# Patient Record
Sex: MALE | Race: WHITE | NOT HISPANIC OR LATINO | Employment: FULL TIME | URBAN - METROPOLITAN AREA
[De-identification: names, ages, dates, MRNs, and addresses within clinical notes are randomized per-mention and may not be internally consistent; named-entity substitution may affect disease eponyms.]

---

## 2024-04-08 ENCOUNTER — APPOINTMENT (OUTPATIENT)
Dept: PRIMARY CARE | Facility: CLINIC | Age: 29
End: 2024-04-08
Payer: MEDICAID

## 2024-04-10 ENCOUNTER — APPOINTMENT (OUTPATIENT)
Dept: PRIMARY CARE | Facility: CLINIC | Age: 29
End: 2024-04-10
Payer: MEDICAID

## 2024-04-16 ENCOUNTER — HOSPITAL ENCOUNTER (EMERGENCY)
Facility: HOSPITAL | Age: 29
Discharge: HOME | End: 2024-04-16
Payer: MEDICAID

## 2024-04-16 ENCOUNTER — APPOINTMENT (OUTPATIENT)
Dept: RADIOLOGY | Facility: HOSPITAL | Age: 29
End: 2024-04-16
Payer: MEDICAID

## 2024-04-16 VITALS
OXYGEN SATURATION: 98 % | SYSTOLIC BLOOD PRESSURE: 117 MMHG | WEIGHT: 220 LBS | RESPIRATION RATE: 16 BRPM | TEMPERATURE: 97.9 F | DIASTOLIC BLOOD PRESSURE: 81 MMHG | HEIGHT: 71 IN | HEART RATE: 71 BPM | BODY MASS INDEX: 30.8 KG/M2

## 2024-04-16 DIAGNOSIS — N43.3 HYDROCELE IN ADULT: Primary | ICD-10-CM

## 2024-04-16 LAB
APPEARANCE UR: CLEAR
BILIRUB UR STRIP.AUTO-MCNC: NEGATIVE MG/DL
COLOR UR: NORMAL
GLUCOSE UR STRIP.AUTO-MCNC: NORMAL MG/DL
KETONES UR STRIP.AUTO-MCNC: NEGATIVE MG/DL
LEUKOCYTE ESTERASE UR QL STRIP.AUTO: NEGATIVE
NITRITE UR QL STRIP.AUTO: NEGATIVE
PH UR STRIP.AUTO: 6 [PH]
PROT UR STRIP.AUTO-MCNC: NEGATIVE MG/DL
RBC # UR STRIP.AUTO: NEGATIVE /UL
SP GR UR STRIP.AUTO: 1.02
UROBILINOGEN UR STRIP.AUTO-MCNC: NORMAL MG/DL

## 2024-04-16 PROCEDURE — 99284 EMERGENCY DEPT VISIT MOD MDM: CPT | Mod: 25

## 2024-04-16 PROCEDURE — 2500000001 HC RX 250 WO HCPCS SELF ADMINISTERED DRUGS (ALT 637 FOR MEDICARE OP): Mod: SE | Performed by: NURSE PRACTITIONER

## 2024-04-16 PROCEDURE — 76870 US EXAM SCROTUM: CPT

## 2024-04-16 PROCEDURE — 76870 US EXAM SCROTUM: CPT | Performed by: RADIOLOGY

## 2024-04-16 PROCEDURE — 81003 URINALYSIS AUTO W/O SCOPE: CPT | Performed by: NURSE PRACTITIONER

## 2024-04-16 PROCEDURE — 99284 EMERGENCY DEPT VISIT MOD MDM: CPT | Performed by: NURSE PRACTITIONER

## 2024-04-16 RX ORDER — NAPROXEN 500 MG/1
500 TABLET ORAL 2 TIMES DAILY PRN
Qty: 14 TABLET | Refills: 0 | Status: SHIPPED | OUTPATIENT
Start: 2024-04-16 | End: 2024-04-23

## 2024-04-16 RX ORDER — IBUPROFEN 600 MG/1
600 TABLET ORAL ONCE
Status: COMPLETED | OUTPATIENT
Start: 2024-04-16 | End: 2024-04-16

## 2024-04-16 RX ADMIN — IBUPROFEN 600 MG: 600 TABLET, FILM COATED ORAL at 16:04

## 2024-04-16 ASSESSMENT — COLUMBIA-SUICIDE SEVERITY RATING SCALE - C-SSRS
1. IN THE PAST MONTH, HAVE YOU WISHED YOU WERE DEAD OR WISHED YOU COULD GO TO SLEEP AND NOT WAKE UP?: NO
6. HAVE YOU EVER DONE ANYTHING, STARTED TO DO ANYTHING, OR PREPARED TO DO ANYTHING TO END YOUR LIFE?: NO
2. HAVE YOU ACTUALLY HAD ANY THOUGHTS OF KILLING YOURSELF?: NO

## 2024-04-16 ASSESSMENT — PAIN - FUNCTIONAL ASSESSMENT: PAIN_FUNCTIONAL_ASSESSMENT: 0-10

## 2024-04-16 ASSESSMENT — PAIN SCALES - GENERAL: PAINLEVEL_OUTOF10: 2

## 2024-04-16 NOTE — ED PROVIDER NOTES
Emergency Department Encounter  [facility] EMERGENCY MEDICINE    Patient: Bg Rhoades  MRN: [id number]  : 1995  Date of Evaluation: 2024  ED Provider: ANA Soto      Chief Complaint       Chief Complaint   Patient presents with    Testicle Pain        Limitations to History: none  Historian: patient  Records reviewed: EMR inpatient and outpatient notes, Care Everywhere    This is a 29-year-old male with a PMH of uveitis who presents to the emergency room with left testicle pain.  Patient states symptoms have been ongoing for a couple of weeks.  Patient states that he had a dream a couple weeks ago that he had left testicle pain and then woke up the next morning with left testicle pain.  Denies any direct injury or trauma.  Patient is sexually active with 1 female partner.  Denies any penile discharge or urinary symptoms.    PMH: Uveitis (left eye)  PSH: Lasik eye surgery  Allergies: NKDA  Social HX: Denies smoking, alcohol or drug use.  Family HX: No family history pertinent to current presenting problem  Medications: Reviewed per EMR    ROS:     Review of Systems   Genitourinary:  Positive for testicular pain.     14 systems reviewed and otherwise acutely negative except as in the [place].        Past History   No past medical history on file.  No past surgical history on file.      Medications/Allergies     Previous Medications    No medications on file     No Known Allergies     Physical Exam       ED Triage Vitals [24 1313]   Temperature Heart Rate Respirations BP   36.6 °C (97.9 °F) 67 18 115/73      Pulse Ox Temp Source Heart Rate Source Patient Position   98 % Temporal -- --      BP Location FiO2 (%)     -- 21 %       Physical Exam:    Appearance: Alert, oriented , cooperative,  in no acute distress. Well nourished & well hydrated.    Skin: Intact,  dry skin, no lesions, rash, petechiae or purpura.     ENT: Hearing grossly intact. External auditory canals  patent, tympanic membranes intact with visible landmarks. Nares patent, mucus membranes moist. Dentition without lesions. Pharynx clear, uvula midline.     Neck: Supple, without meningismus.     Pulmonary: Clear bilaterally with good chest wall excursion. No rales, rhonchi or wheezing. No accessory muscle use or stridor.    Cardiac: Normal S1, S2 without murmur, rub, gallop or extrasystole. No JVD, Carotids without bruits.    Abdomen: Soft, nontender, active bowel sounds.  No palpable organomegaly.  No rebound or guarding.  No CVA tenderness.    Genitourinary: Exam completed with Wilian research assistant at bedside. No testicular masses. No swelling. No rash or genital lesions.    Musculoskeletal: Full range of motion. no pain, edema, or deformity. Pulses full and equal. No cyanosis, clubbing, or edema.    Neurological:  Normal sensation, no weakness, no focal findings identified.    Psychiatric: Appropriate mood and affect.       Diagnostics   Labs:  Results for orders placed or performed during the hospital encounter of 04/16/24 (from the past 24 hour(s))   Urinalysis with Reflex Culture and Microscopic   Result Value Ref Range    Color, Urine Light-Yellow Light-Yellow, Yellow, Dark-Yellow    Appearance, Urine Clear Clear    Specific Gravity, Urine 1.025 1.005 - 1.035    pH, Urine 6.0 5.0, 5.5, 6.0, 6.5, 7.0, 7.5, 8.0    Protein, Urine NEGATIVE NEGATIVE, 10 (TRACE), 20 (TRACE) mg/dL    Glucose, Urine Normal Normal mg/dL    Blood, Urine NEGATIVE NEGATIVE    Ketones, Urine NEGATIVE NEGATIVE mg/dL    Bilirubin, Urine NEGATIVE NEGATIVE    Urobilinogen, Urine Normal Normal mg/dL    Nitrite, Urine NEGATIVE NEGATIVE    Leukocyte Esterase, Urine NEGATIVE NEGATIVE      Radiographs:  US scrotum w doppler   Final Result   1. No evidence of testicular torsion or epididymo-orchitis.   2. Small bilateral hydroceles.   3. There is a right extratesticular region measuring 0.5 x 0.4 x 0.4   cm which demonstrates similar  "echogenicity to the epididymis,   possibly small epididymal or testicular appendix.        MACRO:   None        Signed by: Vincent Trejo 4/16/2024 9:26 PM   Dictation workstation:   [id number]            Assessment   In brief, Bg Rhoades is a 29 y.o. male who presented to the emergency department with left testicular pain.          ED Course/MDM     Diagnoses as of 04/16/24 2144   Hydrocele in adult      Visit Vitals  /81   Pulse 71   Temp 36.6 °C (97.9 °F) (Temporal)   Resp 16   Ht 1.803 m (5' 11\")   Wt 99.8 kg (220 lb)   SpO2 98%   BMI 30.68 kg/m²   BSA 2.24 m²       Medications   ibuprofen tablet 600 mg (600 mg oral Given 4/16/24 1604)       Patient remained stable while in the emergency department. Previous outpatient and ED records were reviewed. Outside records were reviewed.  Differentials include hydrocele, varicocele, testicular torsion, mass.  Patient declined STI testing in the emergency room today.  Urinalysis was negative for infection.  Scrotal ultrasound showed no evidence of testicular torsion or epididymal orchitis.  Small bilateral hydroceles.  There is a right extratesticular region measuring 0.5 x 0.4 x 0.4 cm which demonstrates similar echogenicity to the epididymitis possibly a small epididymal or testicular appendix.  Patient was advised of the ultrasound findings.  Patient was advised to follow-up with urology and was prescribed ibuprofen as needed for pain.  Patient was advised to return the emergency room with worsening symptoms.    Final Impression      1. Hydrocele in adult          DISPOSITION  Disposition: Discharged home    Comment: Please note this report has been produced using speech recognition software and may contain errors related to that system including errors in grammar, punctuation, and spelling, as well as words and phrases that may be inappropriate.  If there are any questions or concerns please feel free to contact the dictating provider for " clarification.    Zita Louise, MONA-MONA Weinberg-CNP  04/16/24 2140

## 2024-04-17 LAB — HOLD SPECIMEN: NORMAL

## 2024-06-04 ENCOUNTER — OFFICE VISIT (OUTPATIENT)
Dept: UROLOGY | Facility: HOSPITAL | Age: 29
End: 2024-06-04
Payer: MEDICAID

## 2024-06-04 VITALS
BODY MASS INDEX: 29.67 KG/M2 | HEART RATE: 71 BPM | WEIGHT: 212.74 LBS | OXYGEN SATURATION: 99 % | DIASTOLIC BLOOD PRESSURE: 64 MMHG | SYSTOLIC BLOOD PRESSURE: 125 MMHG | RESPIRATION RATE: 20 BRPM | TEMPERATURE: 98.2 F

## 2024-06-04 DIAGNOSIS — N43.2 OTHER HYDROCELE: Primary | ICD-10-CM

## 2024-06-04 PROBLEM — N43.3 HYDROCELE: Status: ACTIVE | Noted: 2024-06-04

## 2024-06-04 PROCEDURE — 99203 OFFICE O/P NEW LOW 30 MIN: CPT | Performed by: STUDENT IN AN ORGANIZED HEALTH CARE EDUCATION/TRAINING PROGRAM

## 2024-06-04 PROCEDURE — 99213 OFFICE O/P EST LOW 20 MIN: CPT | Performed by: STUDENT IN AN ORGANIZED HEALTH CARE EDUCATION/TRAINING PROGRAM

## 2024-06-04 PROCEDURE — 1036F TOBACCO NON-USER: CPT | Performed by: STUDENT IN AN ORGANIZED HEALTH CARE EDUCATION/TRAINING PROGRAM

## 2024-06-04 RX ORDER — PREDNISOLONE ACETATE 10 MG/ML
1 SUSPENSION/ DROPS OPHTHALMIC
COMMUNITY
Start: 2023-06-30

## 2024-06-04 ASSESSMENT — PATIENT HEALTH QUESTIONNAIRE - PHQ9
1. LITTLE INTEREST OR PLEASURE IN DOING THINGS: NOT AT ALL
2. FEELING DOWN, DEPRESSED OR HOPELESS: NOT AT ALL
SUM OF ALL RESPONSES TO PHQ9 QUESTIONS 1 AND 2: 0

## 2024-06-04 ASSESSMENT — PAIN SCALES - GENERAL: PAINLEVEL: 0-NO PAIN

## 2024-06-04 NOTE — PROGRESS NOTES
Subjective     Bg Rhoades is a 29 y.o. male with testicular pain and b/l hydrocele who presents as a new patient kindly referred by Dani COWAN.     He complains of testicular sensitivity on the R side. He denies inflammation of the scrotum. He states he is able to do most of his daily activities without issue.     He denies past history of saddle trauma, undescended testicles in childhood requiring surgery or concern/history of STI.     PMHx:  has no past medical history on file.    PSHx: Denies abdominal surgeries    Social: Tobacco Use: Never smoker    Family: Cancer-related family history is not on file.              Review of Systems   All other systems reviewed and are negative.      Objective   Physical Exam  Gen: No acute distress     Psych: Alert and oriented x3     Neuro:  Normal ROM    Resp: Nonlabored respirations     CV: Regular rate and rhythm     Abd: S, NT, ND     : Testicles normal b/l, no hydrocele appreciated. L testicle sits slightly higher than R    Skin: Warm, dry and intact without rashes     Lymphatics: No peripheral edema       Assessment/Plan   Problem List Items Addressed This Visit             ICD-10-CM    Hydrocele - Primary N43.3     We discussed that a hydrocele is a benign collection of fluid around his scrotum. The only way to address this hydrocele would be through a brief surgical procedure lasting approximately 30 minutes. I explained the risks associated with surgery including bleeding, infection, damage to adjacent structures, need for further procedures and recurrence. I explained that active surveillance is a reasonable option and the decision for surgical treatment should be determined by the patient's level of bother.     We discussed conservative measures including form-fitting underwear, NSAIDS and ice and heat as necessary. The patient elects to continue with active surveillance and contact us if his level of bother increases.                     Thank you for the  kind referral and allowing me to take part in the care of this patient.        Plan:  FUV PRN          Scribe Attestation  By signing my name below, I, Katelyn Casalla, Scribe   attest that this documentation has been prepared under the direction and in the presence of Trung Soliman MD MPH.

## 2024-06-04 NOTE — LETTER
June 4, 2024     MONA Soto-CHAPO  [address]  Department Of Emergency Medicine  [facility] [address]    Patient: Bg Rhoades   YOB: 1995   Date of Visit: 6/4/2024       Dear Dr. Zita Louise, MONA-CNP:    Thank you for referring Bg Rhoades to me for evaluation. Below are my notes for this consultation.  If you have questions, please do not hesitate to call me. I look forward to following your patient along with you.       Sincerely,     Trung Soliman MD MPH      CC: No Recipients  ______________________________________________________________________________________    Subjective     Bg Rhoades is a 29 y.o. male with testicular pain and b/l hydrocele who presents as a new patient kindly referred by Dani COWAN.     He complains of testicular sensitivity on the R side. He denies inflammation of the scrotum. He states he is able to do most of his daily activities without issue.     He denies past history of saddle trauma, undescended testicles in childhood requiring surgery or concern/history of STI.     PMHx:  has no past medical history on file.    PSHx: Denies abdominal surgeries    Social: Tobacco Use: Never smoker    Family: Cancer-related family history is not on file.              Review of Systems   All other systems reviewed and are negative.      Objective   Physical Exam  Gen: No acute distress     Psych: Alert and oriented x3     Neuro:  Normal ROM    Resp: Nonlabored respirations     CV: Regular rate and rhythm     Abd: S, NT, ND     : Testicles normal b/l, no hydrocele appreciated. L testicle sits slightly higher than R    Skin: Warm, dry and intact without rashes     Lymphatics: No peripheral edema       Assessment/Plan   Problem List Items Addressed This Visit             ICD-10-CM    Hydrocele - Primary N43.3     We discussed that a hydrocele is a benign collection of fluid around his scrotum. The only way to address this hydrocele would be through a brief  surgical procedure lasting approximately 30 minutes. I explained the risks associated with surgery including bleeding, infection, damage to adjacent structures, need for further procedures and recurrence. I explained that active surveillance is a reasonable option and the decision for surgical treatment should be determined by the patient's level of bother.     We discussed conservative measures including form-fitting underwear, NSAIDS and ice and heat as necessary. The patient elects to continue with active surveillance and contact us if his level of bother increases.                     Thank you for the kind referral and allowing me to take part in the care of this patient.        Plan:  FUV PRN          Scribe Attestation  By signing my name below, I, Katelyn Casalla, Marye   attest that this documentation has been prepared under the direction and in the presence of Trung Soliman MD MPH.

## 2024-06-04 NOTE — ASSESSMENT & PLAN NOTE
We discussed that a hydrocele is a benign collection of fluid around his scrotum. The only way to address this hydrocele would be through a brief surgical procedure lasting approximately 30 minutes. I explained the risks associated with surgery including bleeding, infection, damage to adjacent structures, need for further procedures and recurrence. I explained that active surveillance is a reasonable option and the decision for surgical treatment should be determined by the patient's level of bother.     We discussed conservative measures including form-fitting underwear, NSAIDS and ice and heat as necessary. The patient elects to continue with active surveillance and contact us if his level of bother increases.